# Patient Record
Sex: FEMALE | Race: WHITE | NOT HISPANIC OR LATINO
[De-identification: names, ages, dates, MRNs, and addresses within clinical notes are randomized per-mention and may not be internally consistent; named-entity substitution may affect disease eponyms.]

---

## 2021-10-07 PROBLEM — Z00.00 ENCOUNTER FOR PREVENTIVE HEALTH EXAMINATION: Status: ACTIVE | Noted: 2021-10-07

## 2021-11-16 ENCOUNTER — RESULT REVIEW (OUTPATIENT)
Age: 56
End: 2021-11-16

## 2021-11-16 ENCOUNTER — LABORATORY RESULT (OUTPATIENT)
Age: 56
End: 2021-11-16

## 2021-11-16 ENCOUNTER — NON-APPOINTMENT (OUTPATIENT)
Age: 56
End: 2021-11-16

## 2021-11-16 ENCOUNTER — OUTPATIENT (OUTPATIENT)
Dept: OUTPATIENT SERVICES | Facility: HOSPITAL | Age: 56
LOS: 1 days | End: 2021-11-16
Payer: COMMERCIAL

## 2021-11-16 ENCOUNTER — APPOINTMENT (OUTPATIENT)
Dept: RHEUMATOLOGY | Facility: CLINIC | Age: 56
End: 2021-11-16
Payer: COMMERCIAL

## 2021-11-16 VITALS
HEIGHT: 61.1 IN | SYSTOLIC BLOOD PRESSURE: 146 MMHG | DIASTOLIC BLOOD PRESSURE: 76 MMHG | WEIGHT: 175 LBS | BODY MASS INDEX: 33.04 KG/M2 | TEMPERATURE: 98 F | OXYGEN SATURATION: 97 % | HEART RATE: 89 BPM

## 2021-11-16 DIAGNOSIS — Z87.442 PERSONAL HISTORY OF URINARY CALCULI: ICD-10-CM

## 2021-11-16 DIAGNOSIS — R68.2 DRY MOUTH, UNSPECIFIED: ICD-10-CM

## 2021-11-16 DIAGNOSIS — M54.9 DORSALGIA, UNSPECIFIED: ICD-10-CM

## 2021-11-16 DIAGNOSIS — G89.29 DORSALGIA, UNSPECIFIED: ICD-10-CM

## 2021-11-16 DIAGNOSIS — Z78.9 OTHER SPECIFIED HEALTH STATUS: ICD-10-CM

## 2021-11-16 PROCEDURE — 99205 OFFICE O/P NEW HI 60 MIN: CPT | Mod: 25

## 2021-11-16 PROCEDURE — 73564 X-RAY EXAM KNEE 4 OR MORE: CPT | Mod: 26,50

## 2021-11-16 PROCEDURE — 73110 X-RAY EXAM OF WRIST: CPT | Mod: 26,50

## 2021-11-16 PROCEDURE — 72202 X-RAY EXAM SI JOINTS 3/> VWS: CPT | Mod: 26

## 2021-11-16 PROCEDURE — 72084 X-RAY EXAM ENTIRE SPI 6/> VW: CPT | Mod: 26

## 2021-11-16 PROCEDURE — 73130 X-RAY EXAM OF HAND: CPT | Mod: 26,50

## 2021-11-16 PROCEDURE — 36415 COLL VENOUS BLD VENIPUNCTURE: CPT

## 2021-11-16 RX ORDER — GLIMEPIRIDE 2 MG/1
2 TABLET ORAL
Refills: 0 | Status: ACTIVE | COMMUNITY

## 2021-11-16 RX ORDER — METFORMIN HYDROCHLORIDE 1000 MG/1
1000 TABLET, COATED ORAL
Refills: 0 | Status: ACTIVE | COMMUNITY

## 2021-11-16 NOTE — PHYSICAL EXAM
[General Appearance - Alert] : alert [General Appearance - In No Acute Distress] : in no acute distress [General Appearance - Well Nourished] : well nourished [General Appearance - Well Developed] : well developed [Sclera] : the sclera and conjunctiva were normal [Outer Ear] : the ears and nose were normal in appearance [Examination Of The Oral Cavity] : the lips and gums were normal [Oropharynx] : the oropharynx was normal [Neck Appearance] : the appearance of the neck was normal [Neck Cervical Mass (___cm)] : no neck mass was observed [Jugular Venous Distention Increased] : there was no jugular-venous distention [Thyroid Diffuse Enlargement] : the thyroid was not enlarged [] : no respiratory distress [Respiration, Rhythm And Depth] : normal respiratory rhythm and effort [Exaggerated Use Of Accessory Muscles For Inspiration] : no accessory muscle use [Auscultation Breath Sounds / Voice Sounds] : lungs were clear to auscultation bilaterally [Heart Rate And Rhythm] : heart rate was normal and rhythm regular [Heart Sounds] : normal S1 and S2 [Murmurs] : no murmurs [Abdomen Soft] : soft [Abdomen Tenderness] : non-tender [Cervical Lymph Nodes Enlarged Posterior Bilaterally] : posterior cervical [Supraclavicular Lymph Nodes Enlarged Bilaterally] : supraclavicular [Nail Clubbing] : no clubbing  or cyanosis of the fingernails [Motor Tone] : muscle strength and tone were normal [Sensation] : the sensory exam was normal to light touch and pinprick [Motor Exam] : the motor exam was normal [Oriented To Time, Place, And Person] : oriented to person, place, and time [Impaired Insight] : insight and judgment were intact [FreeTextEntry1] : scalp, post cervical neck and extensor elbow psoriatic plaques

## 2021-11-16 NOTE — REVIEW OF SYSTEMS
[Chills] : chills [Feeling Tired] : feeling tired [Recent Weight Gain (___ Lbs)] : recent [unfilled] ~Ulb weight gain [Arthralgias] : arthralgias [Joint Pain] : joint pain [Joint Swelling] : joint swelling [Joint Stiffness] : joint stiffness [Limb Pain] : limb pain [Negative] : Gastrointestinal [Fever] : no fever [Feeling Poorly] : not feeling poorly [Recent Weight Loss (___ Lbs)] : no recent weight loss [Dizziness] : no dizziness [Fainting] : no fainting [Anxiety] : no anxiety [Depression] : no depression [Muscle Weakness] : no muscle weakness [Easy Bleeding] : no tendency for easy bleeding [Easy Bruising] : no tendency for easy bruising [Swollen Glands] : no swollen glands [Swollen Glands In The Neck] : no swollen glands in the neck [de-identified] : psoriasis plaques

## 2021-11-16 NOTE — ASSESSMENT
[FreeTextEntry1] : 57 y/o F DM on Metformin 1000mg BID, HTN, HLD,  obesity ( BMI 32), psoriasis  was referred for chronic joint pain and swelling that has been ongoing for past few years. Exam noted with R wrist and R knee active synovitis and psoriatic plaques \par Suspecting inflammatory arthritis, RA vs psoriatic arthritis vs peripheral SpA . \par we talked checking Rheum labs ( also other labs to rule out infections etiology of arthritis Tb, HIV, Hep panel, Lyme) \par Labs done during the visit today. \par Recommended c/w daily NSAIDs for now for joint pain and topical steroids for psoriasis. \par Xrays: spine, SI joint, b/l hand/wrist and knees \par Will avoid steroids given hx of DM ( unknown A1C) \par I discussed role of DMARDs and biologic therapy if labs/images confirms diagnoses. \par Most likely will treat with MTX. \par \par MTX side effects discussed\par Gastrointestinal problems, such as nausea, stomach upset, and loose stools\par Stomatitis or soreness of the mouth\par Abnormal liver chemistries, which are typically mild elevations in hepatic transaminases \par A macular punctate cutaneous eruption, which usually occurs on the extremities, often affecting the elbows and knees, but sparing the trunk\par Central nervous system symptoms, including headache, fatigue, malaise, or impaired ability to concentrate\par Alopecia\par Fever, which is drug-related, although fever can also occur due to infection \par Hematologic abnormalities, particularly macrocytosis, in addition to infrequent but severe myelosuppression \par MTX is an abortifacient that can also induce congenital anomalies if taken during pregnancy, instructed to stop medication 6 months prior to conception.\par occasionally MTX induced lung disease. \par Medication does not work immediately and it will take up to 12 weeks before seeing any improvement\par \par Will call to review work up \par \par f/u in 1 month \par \par \par

## 2021-11-16 NOTE — HISTORY OF PRESENT ILLNESS
[Fatigue] : fatigue [Dry Mouth] : dry mouth [Arthralgias] : arthralgias [Joint Swelling] : joint swelling [Decreased ROM] : decreased range of motion [Morning Stiffness] : morning stiffness [Difficulty Walking] : difficulty walking [Myalgias] : myalgias [Muscle Spasms] : muscle spasms [FreeTextEntry1] : Referred by [facility] member  for Rheumatology consultation \par PCP: Eliezer Haddad \par \par 55 y/o F DM on Metformin 1000mg BID, HTN, HLD,  obesity ( BMI 32) \par menopause for past 2 years with recurrent hot flashes. \par He immigrated from [place] to [place] 8 years ago and soon after noticed sensitivity to both hands after cold exposure, with pain and numbness of all fingers.   2 years ago developed  R knee pain and swelling, initially it was on and off and for past one year feels persistent daily pain, has trouble bare the weight on right side, ambulation is difficult,  for past 4 months she has been experiencing polyarthralgia, hours of AM stiffness. \par Neck pain radiating down to entire spine. \par can not get up from the bed or sitting position without assistance,  the pain. Has trouble making the fist, unable to open jars or bottles. \par Has tried NSAID( brought from [place]) Nimesil which helps with the pain. \par She has hx of psoriasis ( affecting scalp, posterior neck and extensor elbows), using topical steroid cream ( Betamethasone ( brand name  Diprosalic) \par Hx of plantar  fasciitis \par Has fatigue,dry mouth, does not drink enough water \par muscle ache \par \par Denies hx of dactylitis, tendinitis or inflammatory eye disease. \par \par No h/o , memory loss, patchy hair loss, sicca symptoms, photosensitivity, HA,  Raynaud's, oral ulcers, nasal ulcers. \par No history of pleuropericarditis \par No history of protein/hematuria \par No history of cytopenias. \par No Hx of VTE/DVT/PE\par  [Anorexia] : no anorexia [Weight Loss] : no weight loss [Malaise] : no malaise [Fever] : no fever [Chills] : no chills [Malar Facial Rash] : no malar facial rash [Skin Lesions] : no lesions [Skin Nodules] : no skin nodules [Oral Ulcers] : no oral ulcers [Cough] : no cough [Dysphonia] : no dysphonia [Dysphagia] : no dysphagia [Shortness of Breath] : no shortness of breath [Chest Pain] : no chest pain [Joint Warmth] : no joint warmth [Falls] : no falls [Dyspnea] : no dyspnea [Muscle Weakness] : no muscle weakness [Muscle Cramping] : no muscle cramping [Visual Changes] : no visual changes [Eye Pain] : no eye pain [Eye Redness] : no eye redness [Dry Eyes] : no dry eyes

## 2021-11-19 LAB
25(OH)D3 SERPL-MCNC: 14.9 NG/ML
A PHAGOCYTOPH IGG TITR SER IF: NORMAL TITER
ALBUMIN SERPL ELPH-MCNC: 4.7 G/DL
ALP BLD-CCNC: 94 U/L
ALT SERPL-CCNC: 26 U/L
ANION GAP SERPL CALC-SCNC: 13 MMOL/L
APPEARANCE: ABNORMAL
APPEARANCE: ABNORMAL
AST SERPL-CCNC: 23 U/L
B BURGDOR AB SER QL IA: NEGATIVE
B BURGDOR AB SER-IMP: NEGATIVE
B BURGDOR IGG+IGM SER QL: 0.07 INDEX
B MICROTI IGG TITR SER: NORMAL TITER
BACTERIA: NEGATIVE
BASOPHILS # BLD AUTO: 0.01 K/UL
BASOPHILS NFR BLD AUTO: 0.2 %
BILIRUB SERPL-MCNC: 0.8 MG/DL
BILIRUBIN URINE: NEGATIVE
BILIRUBIN URINE: NEGATIVE
BLOOD URINE: NEGATIVE
BLOOD URINE: NEGATIVE
BUN SERPL-MCNC: 13 MG/DL
C3 SERPL-MCNC: 202 MG/DL
C4 SERPL-MCNC: 32 MG/DL
CALCIUM SERPL-MCNC: 9.8 MG/DL
CCP AB SER IA-ACNC: <8 UNITS
CHLORIDE SERPL-SCNC: 103 MMOL/L
CHOLEST SERPL-MCNC: 161 MG/DL
CO2 SERPL-SCNC: 26 MMOL/L
COLOR: YELLOW
COLOR: YELLOW
CREAT SERPL-MCNC: 0.58 MG/DL
CRP SERPL-MCNC: 17 MG/L
E CHAFFEENSIS IGG TITR SER IF: NORMAL TITER
ENA SS-A AB SER IA-ACNC: <0.2 AL
ENA SS-B AB SER IA-ACNC: <0.2 AL
EOSINOPHIL # BLD AUTO: 0.04 K/UL
EOSINOPHIL NFR BLD AUTO: 0.6 %
ERYTHROCYTE [SEDIMENTATION RATE] IN BLOOD BY WESTERGREN METHOD: 35 MM/HR
ESTIMATED AVERAGE GLUCOSE: 223 MG/DL
GLUCOSE QUALITATIVE U: ABNORMAL
GLUCOSE QUALITATIVE U: ABNORMAL
GLUCOSE SERPL-MCNC: 251 MG/DL
HBA1C MFR BLD HPLC: 9.4 %
HBV CORE IGG+IGM SER QL: NONREACTIVE
HBV SURFACE AB SER QL: REACTIVE
HBV SURFACE AG SER QL: NONREACTIVE
HCT VFR BLD CALC: 40.9 %
HCV AB SER QL: NONREACTIVE
HCV S/CO RATIO: 0.09 S/CO
HDLC SERPL-MCNC: 47 MG/DL
HGB BLD-MCNC: 13.9 G/DL
HIV1+2 AB SPEC QL IA.RAPID: NONREACTIVE
HYALINE CASTS: 1 /LPF
IMM GRANULOCYTES NFR BLD AUTO: 0.3 %
KETONES URINE: NORMAL
KETONES URINE: NORMAL
LDLC SERPL CALC-MCNC: 92 MG/DL
LEUKOCYTE ESTERASE URINE: ABNORMAL
LEUKOCYTE ESTERASE URINE: ABNORMAL
LYMPHOCYTES # BLD AUTO: 1.16 K/UL
LYMPHOCYTES NFR BLD AUTO: 18.1 %
M TB IFN-G BLD-IMP: POSITIVE
MAN DIFF?: NORMAL
MCHC RBC-ENTMCNC: 29.8 PG
MCHC RBC-ENTMCNC: 34 GM/DL
MCV RBC AUTO: 87.6 FL
MICROSCOPIC-UA: NORMAL
MONOCYTES # BLD AUTO: 0.41 K/UL
MONOCYTES NFR BLD AUTO: 6.4 %
NEUTROPHILS # BLD AUTO: 4.77 K/UL
NEUTROPHILS NFR BLD AUTO: 74.4 %
NITRITE URINE: NEGATIVE
NITRITE URINE: NEGATIVE
NONHDLC SERPL-MCNC: 114 MG/DL
PH URINE: 6
PH URINE: 6
PLATELET # BLD AUTO: 262 K/UL
POTASSIUM SERPL-SCNC: 4.4 MMOL/L
PROT SERPL-MCNC: 7.4 G/DL
PROTEIN URINE: ABNORMAL
PROTEIN URINE: ABNORMAL
QUANTIFERON TB PLUS MITOGEN MINUS NIL: 8.67 IU/ML
QUANTIFERON TB PLUS NIL: 0.73 IU/ML
QUANTIFERON TB PLUS TB1 MINUS NIL: 8.68 IU/ML
QUANTIFERON TB PLUS TB2 MINUS NIL: 8.55 IU/ML
RBC # BLD: 4.67 M/UL
RBC # FLD: 12.3 %
RED BLOOD CELLS URINE: 1 /HPF
RF+CCP IGG SER-IMP: NEGATIVE
RHEUMATOID FACT SER QL: <10 IU/ML
SODIUM SERPL-SCNC: 142 MMOL/L
SPECIFIC GRAVITY URINE: 1.03
SPECIFIC GRAVITY URINE: 1.03
SQUAMOUS EPITHELIAL CELLS: 2 /HPF
TRIGL SERPL-MCNC: 112 MG/DL
URATE SERPL-MCNC: 3.5 MG/DL
UROBILINOGEN URINE: NORMAL
UROBILINOGEN URINE: NORMAL
WBC # FLD AUTO: 6.41 K/UL
WHITE BLOOD CELLS URINE: 15 /HPF

## 2021-11-29 LAB
ANA PAT FLD IF-IMP: ABNORMAL
ANACR T: ABNORMAL
HLA-B27 RELATED AG QL: NEGATIVE

## 2021-12-17 ENCOUNTER — APPOINTMENT (OUTPATIENT)
Dept: RHEUMATOLOGY | Facility: CLINIC | Age: 56
End: 2021-12-17
Payer: COMMERCIAL

## 2021-12-17 DIAGNOSIS — E11.9 TYPE 2 DIABETES MELLITUS W/OUT COMPLICATIONS: ICD-10-CM

## 2021-12-17 PROCEDURE — 99443: CPT

## 2021-12-17 RX ORDER — HYDROXYCHLOROQUINE SULFATE 200 MG/1
200 TABLET, FILM COATED ORAL TWICE DAILY
Qty: 60 | Refills: 3 | Status: DISCONTINUED | COMMUNITY
Start: 2021-12-17 | End: 2021-12-17

## 2021-12-17 RX ORDER — MELOXICAM 7.5 MG/1
7.5 TABLET ORAL
Qty: 30 | Refills: 0 | Status: ACTIVE | COMMUNITY
Start: 2021-12-17 | End: 1900-01-01

## 2021-12-17 NOTE — HISTORY OF PRESENT ILLNESS
[Home] : at home, [unfilled] , at the time of the visit. [Medical Office: ([facility])___] : at the medical office located in  [Verbal consent obtained from patient] : the patient, [unfilled] [Fatigue] : fatigue [Dry Mouth] : dry mouth [Arthralgias] : arthralgias [Joint Swelling] : joint swelling [Decreased ROM] : decreased range of motion [Morning Stiffness] : morning stiffness [Difficulty Walking] : difficulty walking [Myalgias] : myalgias [Muscle Spasms] : muscle spasms [FreeTextEntry1] : Follow up: 12/17/21 \par Telephonic visit \par 55 y/o F DM on Metformin 1000mg BID, HTN, HLD,  obesity, inflammatory arthritis likely seronegative RA or psoriatic arthritis \par Has elevated inflammatory markers, RF, CCP negative \par Low titer + ANBA but negative confirmatory lupus labs- xrays no radiographic inflammatory changes reported. \par Found uncontrolled DM with high HgA1C- pending endo appt ( scheduled for next week) \par +Tb mohini- pending CXR. \par Suspecting seronegative RA vs psoriatic arthritis. \par Vit D deficiency \par Reports severe joint pain and swelling. \par \par Meds:\par Ergocalciferol 50.000IU weekly \par \par \par \par Referred by [facility] community member  for Rheumatology consultation \par PCP: Eliezer Haddad \par \par 55 y/o F DM on Metformin 1000mg BID, HTN, HLD,  obesity ( BMI 32) \par menopause for past 2 years with recurrent hot flashes. \par He immigrated from [place] to [place] 8 years ago and soon after noticed sensitivity to both hands after cold exposure, with pain and numbness of all fingers.   2 years ago developed  R knee pain and swelling, initially it was on and off and for past one year feels persistent daily pain, has trouble bare the weight on right side, ambulation is difficult,  for past 4 months she has been experiencing polyarthralgia, hours of AM stiffness. \par Neck pain radiating down to entire spine. \par can not get up from the bed or sitting position without assistance,  the pain. Has trouble making the fist, unable to open jars or bottles. \par Has tried NSAID( brought from [place]) Nimesil which helps with the pain. \par She has hx of psoriasis ( affecting scalp, posterior neck and extensor elbows), using topical steroid cream ( Betamethasone ( brand name  Diprosalic) \par Hx of plantar  fasciitis \par Has fatigue,dry mouth, does not drink enough water \par muscle ache \par \par Denies hx of dactylitis, tendinitis or inflammatory eye disease. \par \par No h/o , memory loss, patchy hair loss, sicca symptoms, photosensitivity, HA,  Raynaud's, oral ulcers, nasal ulcers. \par No history of pleuropericarditis \par No history of protein/hematuria \par No history of cytopenias. \par No Hx of VTE/DVT/PE\par  [Anorexia] : no anorexia [Weight Loss] : no weight loss [Malaise] : no malaise [Fever] : no fever [Chills] : no chills [Malar Facial Rash] : no malar facial rash [Skin Lesions] : no lesions [Skin Nodules] : no skin nodules [Oral Ulcers] : no oral ulcers [Cough] : no cough [Dysphonia] : no dysphonia [Dysphagia] : no dysphagia [Shortness of Breath] : no shortness of breath [Chest Pain] : no chest pain [Joint Warmth] : no joint warmth [Falls] : no falls [Dyspnea] : no dyspnea [Muscle Weakness] : no muscle weakness [Muscle Cramping] : no muscle cramping [Visual Changes] : no visual changes [Eye Pain] : no eye pain [Eye Redness] : no eye redness [Dry Eyes] : no dry eyes

## 2021-12-17 NOTE — ASSESSMENT
[FreeTextEntry1] : 55 y/o F DM on Metformin 1000mg BID, HTN, HLD,  obesity ( BMI 32), psoriasis  was referred for chronic joint pain and swelling that has been ongoing for past few years. Original exam noted with R wrist and R knee active synovitis and psoriatic plaques \par Suspecting inflammatory arthritis, RA vs psoriatic arthritis\par Has elevated inflammatory markers, RF, CCP negative \par Low titer + GEMMA but negative confirmatory lupus labs- xrays no radiographic inflammatory changes reported. \par Found uncontrolled DM with high HgA1C( >9)- pending endo appt ( scheduled for next week) \par +Tb mohini- pending CXR. \par \par  \par 1. Inflammatory arthritis: seronegative RA vs psoriatic arthritis \par start low dose MTX 7.5mg q weekly and Folic acid, she will need follow up labs in 3-4 weeks after starting MTX\par Can also take Meloxicam 7.5mg for pain instead of NSAID med she is getting from outside of the  and not helping. \par Most likely will treat with MTX. \par \par MTX side effects discussed\par Gastrointestinal problems, such as nausea, stomach upset, and loose stools\par Stomatitis or soreness of the mouth\par Abnormal liver chemistries, which are typically mild elevations in hepatic transaminases \par A macular punctate cutaneous eruption, which usually occurs on the extremities, often affecting the elbows and knees, but sparing the trunk\par Central nervous system symptoms, including headache, fatigue, malaise, or impaired ability to concentrate\par Alopecia\par Fever, which is drug-related, although fever can also occur due to infection \par Hematologic abnormalities, particularly macrocytosis, in addition to infrequent but severe myelosuppression \par occasionally MTX induced lung disease. \par Medication does not work immediately and it will take up to 12 weeks before seeing any improvement\par \par \par 2. Possible latent TB: pending CXR and ID consultation, pt most likely need to be treated as she may need biologic therapy in the future if MTX \par Faxed CRX referral at [phone] to be done outside of the [facility] as per pt request. \par \par 3. Possible UTI: Pt does not have symptoms but she is DM and will start MTX, so will treat for amoxicillin X5 days. \par \par 4. Uncontrolled DM: has appt with endocrinologist, likely she will need insulin. \par \par 5. Vit D deficiency: c/w replacement once a week.  \par \par f/u in 1 month \par \par \par

## 2021-12-17 NOTE — REVIEW OF SYSTEMS
[Chills] : chills [Feeling Tired] : feeling tired [Recent Weight Gain (___ Lbs)] : recent [unfilled] ~Ulb weight gain [Arthralgias] : arthralgias [Joint Pain] : joint pain [Joint Swelling] : joint swelling [Joint Stiffness] : joint stiffness [Limb Pain] : limb pain [Negative] : Gastrointestinal [Fever] : no fever [Feeling Poorly] : not feeling poorly [Recent Weight Loss (___ Lbs)] : no recent weight loss [Dizziness] : no dizziness [Fainting] : no fainting [Anxiety] : no anxiety [Depression] : no depression [Muscle Weakness] : no muscle weakness [Easy Bleeding] : no tendency for easy bleeding [Easy Bruising] : no tendency for easy bruising [Swollen Glands] : no swollen glands [Swollen Glands In The Neck] : no swollen glands in the neck [de-identified] : psoriasis plaques

## 2022-01-18 ENCOUNTER — APPOINTMENT (OUTPATIENT)
Dept: RHEUMATOLOGY | Facility: CLINIC | Age: 57
End: 2022-01-18
Payer: MEDICAID

## 2022-01-18 ENCOUNTER — LABORATORY RESULT (OUTPATIENT)
Age: 57
End: 2022-01-18

## 2022-01-18 VITALS
HEART RATE: 109 BPM | WEIGHT: 172 LBS | SYSTOLIC BLOOD PRESSURE: 115 MMHG | TEMPERATURE: 98.2 F | HEIGHT: 61 IN | OXYGEN SATURATION: 99 % | DIASTOLIC BLOOD PRESSURE: 77 MMHG | BODY MASS INDEX: 32.47 KG/M2

## 2022-01-18 DIAGNOSIS — E78.5 HYPERLIPIDEMIA, UNSPECIFIED: ICD-10-CM

## 2022-01-18 DIAGNOSIS — L40.9 PSORIASIS, UNSPECIFIED: ICD-10-CM

## 2022-01-18 DIAGNOSIS — N39.0 URINARY TRACT INFECTION, SITE NOT SPECIFIED: ICD-10-CM

## 2022-01-18 DIAGNOSIS — B95.5 URINARY TRACT INFECTION, SITE NOT SPECIFIED: ICD-10-CM

## 2022-01-18 DIAGNOSIS — I10 ESSENTIAL (PRIMARY) HYPERTENSION: ICD-10-CM

## 2022-01-18 DIAGNOSIS — R76.8 OTHER SPECIFIED ABNORMAL IMMUNOLOGICAL FINDINGS IN SERUM: ICD-10-CM

## 2022-01-18 PROCEDURE — 99214 OFFICE O/P EST MOD 30 MIN: CPT | Mod: 25

## 2022-01-18 RX ORDER — LISINOPRIL AND HYDROCHLOROTHIAZIDE TABLETS 20; 12.5 MG/1; MG/1
20-12.5 TABLET ORAL DAILY
Qty: 90 | Refills: 0 | Status: ACTIVE | COMMUNITY
Start: 1900-01-01 | End: 1900-01-01

## 2022-01-18 RX ORDER — ATORVASTATIN CALCIUM 10 MG/1
10 TABLET, FILM COATED ORAL
Qty: 90 | Refills: 0 | Status: ACTIVE | COMMUNITY
Start: 1900-01-01 | End: 1900-01-01

## 2022-01-18 RX ORDER — METOPROLOL TARTRATE 50 MG/1
50 TABLET, FILM COATED ORAL DAILY
Qty: 90 | Refills: 0 | Status: ACTIVE | COMMUNITY
Start: 1900-01-01 | End: 1900-01-01

## 2022-01-18 RX ORDER — LISINOPRIL AND HYDROCHLOROTHIAZIDE TABLETS 20; 12.5 MG/1; MG/1
20-12.5 TABLET ORAL
Qty: 90 | Refills: 0 | Status: ACTIVE | COMMUNITY
Start: 2021-06-21

## 2022-01-18 RX ORDER — METOPROLOL SUCCINATE 50 MG/1
50 TABLET, EXTENDED RELEASE ORAL
Qty: 90 | Refills: 0 | Status: ACTIVE | COMMUNITY
Start: 2021-06-21 | End: 1900-01-01

## 2022-01-18 NOTE — HISTORY OF PRESENT ILLNESS
[Fatigue] : fatigue [Dry Mouth] : dry mouth [Arthralgias] : arthralgias [Joint Swelling] : joint swelling [Decreased ROM] : decreased range of motion [Morning Stiffness] : morning stiffness [Difficulty Walking] : difficulty walking [Myalgias] : myalgias [Muscle Spasms] : muscle spasms [___ Month(s) Ago] : [unfilled] month(s) ago [FreeTextEntry1] : Follow up: 1/18/22 \par \par 55 y/o F DM on Metformin 1000mg BID, HTN, HLD,  obesity, inflammatory arthritis likely seronegative RA or psoriatic arthritis \par Has elevated inflammatory markers, RF, CCP negative \par Low titer + GEMMA but negative confirmatory lupus labs- xrays no radiographic inflammatory changes reported. \par Found uncontrolled DM with high HgA1C- saw endo, recommended insulin but she declined. \par Likely latent Tb- had Xrays done outside of the [facility], have not received copy, she has not made appt with ID \par UTI- treated with abx  \par \par Suspecting seronegative RA vs psoriatic arthritis. \par Vit D deficiency \par Reports severe joint pain and intermittent swelling. She does not feel any difference since MTX yet. \par \par Meds:\par Ergocalciferol 50.000IU weekly \par MTX 7.5mg q weekly since last visit along with folic acid ( started 1 month ago)\par \par \par \par \par \par \par \par Follow up: 12/17/21 \par Telephonic visit \par 55 y/o F DM on Metformin 1000mg BID, HTN, HLD,  obesity, inflammatory arthritis likely seronegative RA or psoriatic arthritis \par Has elevated inflammatory markers, RF, CCP negative \par Low titer + ANBA but negative confirmatory lupus labs- xrays no radiographic inflammatory changes reported. \par Found uncontrolled DM with high HgA1C- pending endo appt ( scheduled for next week) \par +Tb mohini- pending CXR. \par Suspecting seronegative RA vs psoriatic arthritis. \par Vit D deficiency \par Reports severe joint pain and swelling. \par \par Meds:\par Ergocalciferol 50.000IU weekly \par \par \par \par Referred by [facility] community member  for Rheumatology consultation \par PCP: Eliezer Haddad \par \par 55 y/o F DM on Metformin 1000mg BID, HTN, HLD,  obesity ( BMI 32) \par menopause for past 2 years with recurrent hot flashes. \par He immigrated from [place] to [place] 8 years ago and soon after noticed sensitivity to both hands after cold exposure, with pain and numbness of all fingers.   2 years ago developed  R knee pain and swelling, initially it was on and off and for past one year feels persistent daily pain, has trouble bare the weight on right side, ambulation is difficult,  for past 4 months she has been experiencing polyarthralgia, hours of AM stiffness. \par Neck pain radiating down to entire spine. \par can not get up from the bed or sitting position without assistance,  the pain. Has trouble making the fist, unable to open jars or bottles. \par Has tried NSAID( brought from [place]) Nimesil which helps with the pain. \par She has hx of psoriasis ( affecting scalp, posterior neck and extensor elbows), using topical steroid cream ( Betamethasone ( brand name  Diprosalic) \par Hx of plantar  fasciitis \par Has fatigue,dry mouth, does not drink enough water \par muscle ache \par \par Denies hx of dactylitis, tendinitis or inflammatory eye disease. \par \par No h/o , memory loss, patchy hair loss, sicca symptoms, photosensitivity, HA,  Raynaud's, oral ulcers, nasal ulcers. \par No history of pleuropericarditis \par No history of protein/hematuria \par No history of cytopenias. \par No Hx of VTE/DVT/PE\par  [Anorexia] : no anorexia [Weight Loss] : no weight loss [Malaise] : no malaise [Fever] : no fever [Chills] : no chills [Malar Facial Rash] : no malar facial rash [Skin Lesions] : no lesions [Skin Nodules] : no skin nodules [Oral Ulcers] : no oral ulcers [Cough] : no cough [Dysphonia] : no dysphonia [Dysphagia] : no dysphagia [Shortness of Breath] : no shortness of breath [Chest Pain] : no chest pain [Joint Warmth] : no joint warmth [Falls] : no falls [Dyspnea] : no dyspnea [Muscle Weakness] : no muscle weakness [Muscle Cramping] : no muscle cramping [Visual Changes] : no visual changes [Eye Pain] : no eye pain [Eye Redness] : no eye redness [Dry Eyes] : no dry eyes

## 2022-01-18 NOTE — PHYSICAL EXAM
[General Appearance - Alert] : alert [General Appearance - In No Acute Distress] : in no acute distress [Oriented To Time, Place, And Person] : oriented to person, place, and time [Impaired Insight] : insight and judgment were intact [Respiration, Rhythm And Depth] : normal respiratory rhythm and effort [Auscultation Breath Sounds / Voice Sounds] : lungs were clear to auscultation bilaterally [Heart Rate And Rhythm] : heart rate was normal and rhythm regular [Heart Sounds] : normal S1 and S2 [Nail Clubbing] : no clubbing  or cyanosis of the fingernails [Motor Tone] : muscle strength and tone were normal [Neck Appearance] : the appearance of the neck was normal [] : the neck was supple [FreeTextEntry1] : psoriatic plaques in scalp and ext surfaces of b/l elbow

## 2022-01-18 NOTE — ASSESSMENT
[FreeTextEntry1] : 57 y/o F DM on Metformin 1000mg BID, HTN, HLD,  obesity ( BMI 32), psoriasis  was referred for chronic joint pain and swelling that has been ongoing for past few years. Original exam noted with R wrist and R knee active synovitis and psoriatic plaques \par Suspecting inflammatory arthritis, RA vs psoriatic arthritis\par Has elevated inflammatory markers, RF, CCP negative \par Low titer + GEMMA but negative confirmatory lupus labs- xrays no radiographic inflammatory changes reported. \par Found uncontrolled DM with high HgA1C( >9) saw shana, recommended insulin but she declined. \par I had extensive conversation with pt about importance of diabetes control, risk of CV, stroke and renal failure discusses. \par \par \par 1. Inflammatory arthritis: seronegative RA vs psoriatic arthritis \par Does not feel any better since MTX, but likely it's too soon and she has been taking low dose. \par We talked about if normal f/u labs should increase MTX 7.5mg to 15mg \par c/w Folic acid\par Can apply topical  vitamin D analogs twice daily, given uncontrolled DM will minimize topical steroid use, as absorption is still possible and her psoriasis does not involve more then 2% os skin area. \par Labs done during the visit today. \par + GEMMA, will send AVISE today \par \par \par 2. Possible latent TB: requested copy of recent  CXR and reminded pt about ID consultation, pt most likely need to be treated as she may need biologic therapy in the future if MTX failure. \par \par 3. She has lost follow up with PCP, off BP meds, will renew before pt finds new PCP\par Metoprolol succinate 50\par Lisinopril/HCTZ 20-12.5 and Atorvastatin renewed. \par \par 4. Vit D deficiency: c/w replacement once a week.  \par \par f/u in 2 month \par \par \par

## 2022-01-18 NOTE — REVIEW OF SYSTEMS
[Chills] : chills [Feeling Tired] : feeling tired [Recent Weight Gain (___ Lbs)] : recent [unfilled] ~Ulb weight gain [Arthralgias] : arthralgias [Joint Pain] : joint pain [Joint Swelling] : joint swelling [Joint Stiffness] : joint stiffness [Limb Pain] : limb pain [Negative] : Gastrointestinal [Fever] : no fever [Feeling Poorly] : not feeling poorly [Recent Weight Loss (___ Lbs)] : no recent weight loss [Dizziness] : no dizziness [Fainting] : no fainting [Anxiety] : no anxiety [Depression] : no depression [Muscle Weakness] : no muscle weakness [Easy Bleeding] : no tendency for easy bleeding [Easy Bruising] : no tendency for easy bruising [Swollen Glands] : no swollen glands [Swollen Glands In The Neck] : no swollen glands in the neck [de-identified] : psoriasis plaques

## 2022-01-19 LAB
ALBUMIN SERPL ELPH-MCNC: 5.1 G/DL
ALP BLD-CCNC: 86 U/L
ALT SERPL-CCNC: 35 U/L
ANION GAP SERPL CALC-SCNC: 15 MMOL/L
APPEARANCE: ABNORMAL
AST SERPL-CCNC: 33 U/L
BASOPHILS # BLD AUTO: 0.02 K/UL
BASOPHILS NFR BLD AUTO: 0.4 %
BILIRUB SERPL-MCNC: 0.9 MG/DL
BILIRUBIN URINE: NEGATIVE
BLOOD URINE: NEGATIVE
BUN SERPL-MCNC: 13 MG/DL
CALCIUM SERPL-MCNC: 10.3 MG/DL
CHLORIDE SERPL-SCNC: 101 MMOL/L
CO2 SERPL-SCNC: 24 MMOL/L
COLOR: YELLOW
CREAT SERPL-MCNC: 0.62 MG/DL
CREAT SPEC-SCNC: 145 MG/DL
CREAT/PROT UR: 0.1 RATIO
CRP SERPL-MCNC: 14 MG/L
EOSINOPHIL # BLD AUTO: 0.05 K/UL
EOSINOPHIL NFR BLD AUTO: 0.9 %
ERYTHROCYTE [SEDIMENTATION RATE] IN BLOOD BY WESTERGREN METHOD: 35 MM/HR
GLUCOSE QUALITATIVE U: ABNORMAL
GLUCOSE SERPL-MCNC: 166 MG/DL
HCT VFR BLD CALC: 41.7 %
HGB BLD-MCNC: 13.4 G/DL
IMM GRANULOCYTES NFR BLD AUTO: 0.4 %
KETONES URINE: ABNORMAL
LEUKOCYTE ESTERASE URINE: ABNORMAL
LYMPHOCYTES # BLD AUTO: 1.48 K/UL
LYMPHOCYTES NFR BLD AUTO: 27.6 %
MAN DIFF?: NORMAL
MCHC RBC-ENTMCNC: 28 PG
MCHC RBC-ENTMCNC: 32.1 GM/DL
MCV RBC AUTO: 87.1 FL
MONOCYTES # BLD AUTO: 0.41 K/UL
MONOCYTES NFR BLD AUTO: 7.6 %
NEUTROPHILS # BLD AUTO: 3.39 K/UL
NEUTROPHILS NFR BLD AUTO: 63.1 %
NITRITE URINE: NEGATIVE
PH URINE: 5.5
PLATELET # BLD AUTO: 242 K/UL
POTASSIUM SERPL-SCNC: 4.4 MMOL/L
PROT SERPL-MCNC: 7.2 G/DL
PROT UR-MCNC: 18 MG/DL
PROTEIN URINE: ABNORMAL
RBC # BLD: 4.79 M/UL
RBC # FLD: 13.2 %
SODIUM SERPL-SCNC: 141 MMOL/L
SPECIFIC GRAVITY URINE: 1.03
UROBILINOGEN URINE: NORMAL
WBC # FLD AUTO: 5.37 K/UL

## 2022-02-01 LAB
MISCELLANEOUS TEST: NORMAL
PROC NAME: NORMAL

## 2022-03-14 ENCOUNTER — NON-APPOINTMENT (OUTPATIENT)
Age: 57
End: 2022-03-14

## 2022-03-29 ENCOUNTER — APPOINTMENT (OUTPATIENT)
Dept: RHEUMATOLOGY | Facility: CLINIC | Age: 57
End: 2022-03-29
Payer: MEDICAID

## 2022-03-29 VITALS
SYSTOLIC BLOOD PRESSURE: 133 MMHG | HEIGHT: 61 IN | BODY MASS INDEX: 31.72 KG/M2 | HEART RATE: 91 BPM | WEIGHT: 168 LBS | OXYGEN SATURATION: 98 % | TEMPERATURE: 98.2 F | DIASTOLIC BLOOD PRESSURE: 77 MMHG

## 2022-03-29 DIAGNOSIS — R53.82 CHRONIC FATIGUE, UNSPECIFIED: ICD-10-CM

## 2022-03-29 PROCEDURE — 99214 OFFICE O/P EST MOD 30 MIN: CPT | Mod: 25

## 2022-03-29 RX ORDER — CALCITRIOL 3 UG/G
3 OINTMENT TOPICAL
Qty: 2 | Refills: 1 | Status: ACTIVE | COMMUNITY
Start: 2022-01-18 | End: 1900-01-01

## 2022-03-29 NOTE — REVIEW OF SYSTEMS
[Chills] : chills [Feeling Tired] : feeling tired [Recent Weight Gain (___ Lbs)] : recent [unfilled] ~Ulb weight gain [Arthralgias] : arthralgias [Joint Pain] : joint pain [Joint Swelling] : joint swelling [Joint Stiffness] : joint stiffness [Limb Pain] : limb pain [Negative] : Gastrointestinal [Fever] : no fever [Feeling Poorly] : not feeling poorly [Recent Weight Loss (___ Lbs)] : no recent weight loss [Dizziness] : no dizziness [Fainting] : no fainting [Anxiety] : no anxiety [Depression] : no depression [Muscle Weakness] : no muscle weakness [Easy Bleeding] : no tendency for easy bleeding [Easy Bruising] : no tendency for easy bruising [Swollen Glands] : no swollen glands [Swollen Glands In The Neck] : no swollen glands in the neck [de-identified] : psoriasis plaques

## 2022-03-29 NOTE — HISTORY OF PRESENT ILLNESS
[___ Month(s) Ago] : [unfilled] month(s) ago [Fatigue] : fatigue [Dry Mouth] : dry mouth [Arthralgias] : arthralgias [Joint Swelling] : joint swelling [Decreased ROM] : decreased range of motion [Morning Stiffness] : morning stiffness [Difficulty Walking] : difficulty walking [Myalgias] : myalgias [Muscle Spasms] : muscle spasms [FreeTextEntry1] : Follow up: 3/2922 \par PCP: Dr. Jeronimo Mera \par \par 55 y/o F DM on Metformin 1000mg BID, HTN, HLD,  obesity, inflammatory arthritis likely seronegative RA or psoriatic arthritis \par Has elevated inflammatory markers, RF, CCP negative \par Found uncontrolled DM with high HgA1C- saw endo, recommended insulin but she declined. She takes Glimepiride \par latent Tb- had Xrays done outside of the [facility], saw ID and started on Rifampin but instead of taking 2 tablets she takes one a day. \par MSK symptoms joint pain, swelling and stiffness significantly improved about  since MTX increased, overall feels 30-40% improvement. \par She established care with primary care doc and states BP is well controlled. \par lost 7Ibs since last visit, trying to watch diet. \par \par Meds:\par Ergocalciferol 50.000IU weekly \par MTX 15mg q weekly and folic acid ( started 1 month ago)\par \par \par \par \par \par Follow up: 1/18/22 \par \par 55 y/o F DM on Metformin 1000mg BID, HTN, HLD,  obesity, inflammatory arthritis likely seronegative RA or psoriatic arthritis \par Has elevated inflammatory markers, RF, CCP negative \par Low titer + GEMMA but negative confirmatory lupus labs- xrays no radiographic inflammatory changes reported. \par Found uncontrolled DM with high HgA1C- saw endo, recommended insulin but she declined. \par Likely latent Tb- had Xrays done outside of the [facility], have not received copy, she has not made appt with ID \par UTI- treated with abx  \par \par Suspecting seronegative RA vs psoriatic arthritis. \par Vit D deficiency \par Reports severe joint pain and intermittent swelling. She does not feel any difference since MTX yet. \par \par Meds:\par Ergocalciferol 50.000IU weekly \par MTX 7.5mg q weekly since last visit along with folic acid ( started 1 month ago)\par \par \par \par \par \par \par \par Follow up: 12/17/21 \par Telephonic visit \par 55 y/o F DM on Metformin 1000mg BID, HTN, HLD,  obesity, inflammatory arthritis likely seronegative RA or psoriatic arthritis \par Has elevated inflammatory markers, RF, CCP negative \par Low titer + ANBA but negative confirmatory lupus labs- xrays no radiographic inflammatory changes reported. \par Found uncontrolled DM with high HgA1C- pending endo appt ( scheduled for next week) \par +Tb mohini- pending CXR. \par Suspecting seronegative RA vs psoriatic arthritis. \par Vit D deficiency \par Reports severe joint pain and swelling. \par \par Meds:\par Ergocalciferol 50.000IU weekly \par \par \par \par Referred by [facility] member  for Rheumatology consultation \par PCP: Eliezer Haddad \par \par 55 y/o F DM on Metformin 1000mg BID, HTN, HLD,  obesity ( BMI 32) \par menopause for past 2 years with recurrent hot flashes. \par He immigrated from [place] to [place] 8 years ago and soon after noticed sensitivity to both hands after cold exposure, with pain and numbness of all fingers.   2 years ago developed  R knee pain and swelling, initially it was on and off and for past one year feels persistent daily pain, has trouble bare the weight on right side, ambulation is difficult,  for past 4 months she has been experiencing polyarthralgia, hours of AM stiffness. \par Neck pain radiating down to entire spine. \par can not get up from the bed or sitting position without assistance,  the pain. Has trouble making the fist, unable to open jars or bottles. \par Has tried NSAID( brought from [place]) Nimesil which helps with the pain. \par She has hx of psoriasis ( affecting scalp, posterior neck and extensor elbows), using topical steroid cream ( Betamethasone ( brand name  Diprosalic) \par Hx of plantar  fasciitis \par Has fatigue,dry mouth, does not drink enough water \par muscle ache \par \par Denies hx of dactylitis, tendinitis or inflammatory eye disease. \par \par No h/o , memory loss, patchy hair loss, sicca symptoms, photosensitivity, HA,  Raynaud's, oral ulcers, nasal ulcers. \par No history of pleuropericarditis \par No history of protein/hematuria \par No history of cytopenias. \par No Hx of VTE/DVT/PE\par  [Anorexia] : no anorexia [Weight Loss] : no weight loss [Malaise] : no malaise [Fever] : no fever [Chills] : no chills [Malar Facial Rash] : no malar facial rash [Skin Lesions] : no lesions [Skin Nodules] : no skin nodules [Oral Ulcers] : no oral ulcers [Cough] : no cough [Dysphonia] : no dysphonia [Dysphagia] : no dysphagia [Shortness of Breath] : no shortness of breath [Chest Pain] : no chest pain [Joint Warmth] : no joint warmth [Falls] : no falls [Dyspnea] : no dyspnea [Muscle Weakness] : no muscle weakness [Muscle Cramping] : no muscle cramping [Visual Changes] : no visual changes [Eye Pain] : no eye pain [Eye Redness] : no eye redness [Dry Eyes] : no dry eyes

## 2022-03-29 NOTE — ASSESSMENT
[FreeTextEntry1] : 55 y/o F DM on Metformin 1000mg BID, HTN, HLD,  obesity ( BMI 32), psoriasis  was referred for chronic joint pain and swelling that has been ongoing for past few years. Original exam noted with R wrist and R knee active synovitis and psoriatic plaques \par Suspecting inflammatory arthritis, RA vs psoriatic arthritis\par Has elevated inflammatory markers, RF, CCP negative \par Low titer + GEMMA but negative confirmatory lupus labs- xrays no radiographic inflammatory changes reported. \par Found uncontrolled DM with high HgA1C( >9) saw shana, recommended insulin but she declined. \par Latent TB: on Rifampin and plan is to treat for 4 months. \par \par \par 1. Inflammatory arthritis: seronegative RA vs psoriatic arthritis , joint symptoms improving since MTX increased \par we talked about titrating MTX up to 20mg q weekly if   if monitoring serologies permissive\par will check labs today ( CBC, Chem, ESR, CRP- done during the visit today) \par c/w Folic acid\par May consider adding biologic depends on response on max MTX dose. \par Can apply topical  vitamin D analogs twice daily, given uncontrolled DM will minimize topical steroid use, as absorption is still possible and her psoriasis does not involve more then 2% os skin area. \par \par 2. latent TB:  advised to increase Rifampin as prescribed ( 2tab a day)  \par \par 3. Vit D deficiency: c/w replacement once a week.  \par \par f/u in 2 month \par \par \par

## 2022-03-29 NOTE — PHYSICAL EXAM
[General Appearance - Alert] : alert [General Appearance - In No Acute Distress] : in no acute distress [Neck Appearance] : the appearance of the neck was normal [] : no respiratory distress [Respiration, Rhythm And Depth] : normal respiratory rhythm and effort [Auscultation Breath Sounds / Voice Sounds] : lungs were clear to auscultation bilaterally [Heart Rate And Rhythm] : heart rate was normal and rhythm regular [Heart Sounds] : normal S1 and S2 [Nail Clubbing] : no clubbing  or cyanosis of the fingernails [Motor Tone] : muscle strength and tone were normal [Oriented To Time, Place, And Person] : oriented to person, place, and time [Impaired Insight] : insight and judgment were intact [FreeTextEntry1] : psoriatic plaques in scalp and ext surfaces of b/l elbow

## 2022-03-30 LAB
ALBUMIN SERPL ELPH-MCNC: 5 G/DL
ALP BLD-CCNC: 67 U/L
ALT SERPL-CCNC: 18 U/L
ANION GAP SERPL CALC-SCNC: 14 MMOL/L
AST SERPL-CCNC: 16 U/L
BASOPHILS # BLD AUTO: 0.02 K/UL
BASOPHILS NFR BLD AUTO: 0.3 %
BILIRUB SERPL-MCNC: 0.3 MG/DL
BUN SERPL-MCNC: 10 MG/DL
CALCIUM SERPL-MCNC: 10.4 MG/DL
CHLORIDE SERPL-SCNC: 101 MMOL/L
CO2 SERPL-SCNC: 25 MMOL/L
CREAT SERPL-MCNC: 0.53 MG/DL
CRP SERPL-MCNC: 8 MG/L
EGFR: 109 ML/MIN/1.73M2
EOSINOPHIL # BLD AUTO: 0.09 K/UL
EOSINOPHIL NFR BLD AUTO: 1.3 %
ERYTHROCYTE [SEDIMENTATION RATE] IN BLOOD BY WESTERGREN METHOD: 23 MM/HR
GLUCOSE SERPL-MCNC: 86 MG/DL
HCT VFR BLD CALC: 42.1 %
HGB BLD-MCNC: 13.5 G/DL
IMM GRANULOCYTES NFR BLD AUTO: 0.3 %
LYMPHOCYTES # BLD AUTO: 1.65 K/UL
LYMPHOCYTES NFR BLD AUTO: 24.7 %
MAN DIFF?: NORMAL
MCHC RBC-ENTMCNC: 28.4 PG
MCHC RBC-ENTMCNC: 32.1 GM/DL
MCV RBC AUTO: 88.6 FL
MONOCYTES # BLD AUTO: 0.43 K/UL
MONOCYTES NFR BLD AUTO: 6.4 %
NEUTROPHILS # BLD AUTO: 4.47 K/UL
NEUTROPHILS NFR BLD AUTO: 67 %
PLATELET # BLD AUTO: 260 K/UL
POTASSIUM SERPL-SCNC: 4.3 MMOL/L
PROT SERPL-MCNC: 7.5 G/DL
RBC # BLD: 4.75 M/UL
RBC # FLD: 13.8 %
SODIUM SERPL-SCNC: 140 MMOL/L
WBC # FLD AUTO: 6.68 K/UL

## 2022-03-30 RX ORDER — METHOTREXATE 2.5 MG/1
2.5 TABLET ORAL
Qty: 32 | Refills: 3 | Status: ACTIVE | COMMUNITY
Start: 2021-12-17 | End: 1900-01-01

## 2022-05-09 RX ORDER — ERGOCALCIFEROL 1.25 MG/1
1.25 MG CAPSULE ORAL
Qty: 4 | Refills: 5 | Status: ACTIVE | COMMUNITY
Start: 2021-11-19 | End: 1900-01-01

## 2022-06-09 RX ORDER — FOLIC ACID 1 MG/1
1 TABLET ORAL DAILY
Qty: 30 | Refills: 5 | Status: ACTIVE | COMMUNITY
Start: 2021-12-17 | End: 1900-01-01

## 2022-06-24 ENCOUNTER — APPOINTMENT (OUTPATIENT)
Dept: RHEUMATOLOGY | Facility: CLINIC | Age: 57
End: 2022-06-24
Payer: MEDICAID

## 2022-06-24 VITALS
OXYGEN SATURATION: 96 % | DIASTOLIC BLOOD PRESSURE: 78 MMHG | HEIGHT: 61 IN | WEIGHT: 170 LBS | BODY MASS INDEX: 32.1 KG/M2 | HEART RATE: 104 BPM | TEMPERATURE: 98 F | SYSTOLIC BLOOD PRESSURE: 113 MMHG

## 2022-06-24 DIAGNOSIS — Z22.7 LATENT TUBERCULOSIS: ICD-10-CM

## 2022-06-24 PROCEDURE — 99214 OFFICE O/P EST MOD 30 MIN: CPT | Mod: 25

## 2022-06-24 NOTE — REVIEW OF SYSTEMS
[Chills] : chills [Feeling Tired] : feeling tired [Recent Weight Gain (___ Lbs)] : recent [unfilled] ~Ulb weight gain [Arthralgias] : arthralgias [Joint Pain] : joint pain [Joint Swelling] : joint swelling [Joint Stiffness] : joint stiffness [Limb Pain] : limb pain [Negative] : Gastrointestinal [Fever] : no fever [Feeling Poorly] : not feeling poorly [Recent Weight Loss (___ Lbs)] : no recent weight loss [Dizziness] : no dizziness [Fainting] : no fainting [Anxiety] : no anxiety [Depression] : no depression [Muscle Weakness] : no muscle weakness [Easy Bleeding] : no tendency for easy bleeding [Easy Bruising] : no tendency for easy bruising [Swollen Glands] : no swollen glands [Swollen Glands In The Neck] : no swollen glands in the neck [de-identified] : psoriasis plaques

## 2022-06-24 NOTE — ASSESSMENT
[FreeTextEntry1] : 57 y/o F DM on Metformin 1000mg BID, HTN, HLD,  obesity ( BMI 32), psoriasis  was referred for chronic joint pain and swelling that has been ongoing for past few years. Original exam noted with R wrist and R knee active synovitis and psoriatic plaques \par Suspecting inflammatory arthritis, RA vs psoriatic arthritis\par Has elevated inflammatory markers, RF, CCP negative \par Low titer + GEMMA but negative confirmatory lupus labs- xrays no radiographic inflammatory changes reported. \par Found uncontrolled DM with high HgA1C( >9) saw shana, recommended insulin but she declined. Now on trulicity \par Latent TB: on Rifampin and plan is to treat for 4 months( she will complete treatment in few weeks)\par \par \par 1. Inflammatory arthritis: seronegative RA vs psoriatic arthritis , joint symptoms improving since MTX increased  to 20mg q weekly but now has more GI symptoms, nausea and intermittent abdominal pain. \par We talked about switching po formulation to injectable Rasuvo to bypass Gi effect from oral preparation and monitor symptoms, if no improvement will switch to biologic DMARD such as  Humira. \par check labs: CBC, Chem, ESR, CRP  \par c/w Folic acid. \par Blood drawn during the visit today.\par \par \par 2. latent TB:  c/w Rifampin as prescribed ( 2tab a day)  \par \par 3. Vit D deficiency: c/w replacement once a week.  \par \par \par f/u in 6 weeks \par \par \par

## 2022-06-24 NOTE — HISTORY OF PRESENT ILLNESS
[Fatigue] : fatigue [Dry Mouth] : dry mouth [Arthralgias] : arthralgias [Joint Swelling] : joint swelling [Decreased ROM] : decreased range of motion [Morning Stiffness] : morning stiffness [Difficulty Walking] : difficulty walking [Myalgias] : myalgias [Muscle Spasms] : muscle spasms [FreeTextEntry1] : Follow up: 6/24/22 \par \par 55 y/o F with Inflammatory arthritis: seronegative RA vs psoriatic arthritis , joint symptoms improving since MTX increased to 20mg but she has been experiencing nausea and abdominal pain more. Pain in both thumbs, but otherwise no other joint pain. \par Cervical neck pain. \par Endocrinologist added Trulicity once a week \par \par Meds: \par MTX 20mg \par Folic acid 1mg daily \par \par \par \par Follow up: 3/2922 \par PCP: Dr. Jeronimo Mera \par \par 55 y/o F DM on Metformin 1000mg BID, HTN, HLD,  obesity, inflammatory arthritis likely seronegative RA or psoriatic arthritis \par Has elevated inflammatory markers, RF, CCP negative \par Found uncontrolled DM with high HgA1C- saw endo, recommended insulin but she declined. She takes Glimepiride \par latent Tb- had Xrays done outside of the [facility], saw ID and started on Rifampin but instead of taking 2 tablets she takes one a day. \par MSK symptoms joint pain, swelling and stiffness significantly improved about  since MTX increased, overall feels 30-40% improvement. \par She established care with primary care doc and states BP is well controlled. \par lost 7Ibs since last visit, trying to watch diet. \par \par Meds:\par Ergocalciferol 50.000IU weekly \par MTX 15mg q weekly and folic acid ( started 1 month ago)\par \par \par \par \par \par Follow up: 1/18/22 \par \par 55 y/o F DM on Metformin 1000mg BID, HTN, HLD,  obesity, inflammatory arthritis likely seronegative RA or psoriatic arthritis \par Has elevated inflammatory markers, RF, CCP negative \par Low titer + GEMMA but negative confirmatory lupus labs- xrays no radiographic inflammatory changes reported. \par Found uncontrolled DM with high HgA1C- saw endo, recommended insulin but she declined. \par Likely latent Tb- had Xrays done outside of the [facility], have not received copy, she has not made appt with ID \par UTI- treated with abx  \par \par Suspecting seronegative RA vs psoriatic arthritis. \par Vit D deficiency \par Reports severe joint pain and intermittent swelling. She does not feel any difference since MTX yet. \par \par Meds:\par Ergocalciferol 50.000IU weekly \par MTX 7.5mg q weekly since last visit along with folic acid ( started 1 month ago)\par \par \par \par \par \par \par \par Follow up: 12/17/21 \par Telephonic visit \par 55 y/o F DM on Metformin 1000mg BID, HTN, HLD,  obesity, inflammatory arthritis likely seronegative RA or psoriatic arthritis \par Has elevated inflammatory markers, RF, CCP negative \par Low titer + ANBA but negative confirmatory lupus labs- xrays no radiographic inflammatory changes reported. \par Found uncontrolled DM with high HgA1C- pending endo appt ( scheduled for next week) \par +Tb mohini- pending CXR. \par Suspecting seronegative RA vs psoriatic arthritis. \par Vit D deficiency \par Reports severe joint pain and swelling. \par \par Meds:\par Ergocalciferol 50.000IU weekly \par \par \par \par Referred by [facility] community member  for Rheumatology consultation \par PCP: Eliezer Haddad \par \par 55 y/o F DM on Metformin 1000mg BID, HTN, HLD,  obesity ( BMI 32) \par menopause for past 2 years with recurrent hot flashes. \par He immigrated from [place] to [place] 8 years ago and soon after noticed sensitivity to both hands after cold exposure, with pain and numbness of all fingers.   2 years ago developed  R knee pain and swelling, initially it was on and off and for past one year feels persistent daily pain, has trouble bare the weight on right side, ambulation is difficult,  for past 4 months she has been experiencing polyarthralgia, hours of AM stiffness. \par Neck pain radiating down to entire spine. \par can not get up from the bed or sitting position without assistance,  the pain. Has trouble making the fist, unable to open jars or bottles. \par Has tried NSAID( brought from [place]) Nimesil which helps with the pain. \par She has hx of psoriasis ( affecting scalp, posterior neck and extensor elbows), using topical steroid cream ( Betamethasone ( brand name  Diprosalic) \par Hx of plantar  fasciitis \par Has fatigue,dry mouth, does not drink enough water \par muscle ache \par \par Denies hx of dactylitis, tendinitis or inflammatory eye disease. \par \par No h/o , memory loss, patchy hair loss, sicca symptoms, photosensitivity, HA,  Raynaud's, oral ulcers, nasal ulcers. \par No history of pleuropericarditis \par No history of protein/hematuria \par No history of cytopenias. \par No Hx of VTE/DVT/PE\par  [___ Month(s) Ago] : [unfilled] month(s) ago [Anorexia] : no anorexia [Weight Loss] : no weight loss [Malaise] : no malaise [Fever] : no fever [Chills] : no chills [Malar Facial Rash] : no malar facial rash [Skin Lesions] : no lesions [Skin Nodules] : no skin nodules [Oral Ulcers] : no oral ulcers [Cough] : no cough [Dysphonia] : no dysphonia [Dysphagia] : no dysphagia [Shortness of Breath] : no shortness of breath [Chest Pain] : no chest pain [Joint Warmth] : no joint warmth [Falls] : no falls [Dyspnea] : no dyspnea [Muscle Weakness] : no muscle weakness [Muscle Cramping] : no muscle cramping [Visual Changes] : no visual changes [Eye Pain] : no eye pain [Eye Redness] : no eye redness [Dry Eyes] : no dry eyes

## 2022-06-27 LAB
ALBUMIN SERPL ELPH-MCNC: 5.1 G/DL
ALP BLD-CCNC: 72 U/L
ALT SERPL-CCNC: 21 U/L
ANION GAP SERPL CALC-SCNC: 18 MMOL/L
AST SERPL-CCNC: 17 U/L
BASOPHILS # BLD AUTO: 0.02 K/UL
BASOPHILS NFR BLD AUTO: 0.3 %
BILIRUB SERPL-MCNC: 0.4 MG/DL
BUN SERPL-MCNC: 8 MG/DL
CALCIUM SERPL-MCNC: 10.5 MG/DL
CHLORIDE SERPL-SCNC: 97 MMOL/L
CO2 SERPL-SCNC: 25 MMOL/L
CREAT SERPL-MCNC: 0.56 MG/DL
CRP SERPL-MCNC: 9 MG/L
EGFR: 108 ML/MIN/1.73M2
EOSINOPHIL # BLD AUTO: 0.06 K/UL
EOSINOPHIL NFR BLD AUTO: 0.8 %
ERYTHROCYTE [SEDIMENTATION RATE] IN BLOOD BY WESTERGREN METHOD: 31 MM/HR
GLUCOSE SERPL-MCNC: 96 MG/DL
HCT VFR BLD CALC: 44.8 %
HGB BLD-MCNC: 14.9 G/DL
IMM GRANULOCYTES NFR BLD AUTO: 0.3 %
LYMPHOCYTES # BLD AUTO: 1.95 K/UL
LYMPHOCYTES NFR BLD AUTO: 26.6 %
MAN DIFF?: NORMAL
MCHC RBC-ENTMCNC: 29.8 PG
MCHC RBC-ENTMCNC: 33.3 GM/DL
MCV RBC AUTO: 89.6 FL
MONOCYTES # BLD AUTO: 0.57 K/UL
MONOCYTES NFR BLD AUTO: 7.8 %
NEUTROPHILS # BLD AUTO: 4.72 K/UL
NEUTROPHILS NFR BLD AUTO: 64.2 %
PLATELET # BLD AUTO: 290 K/UL
POTASSIUM SERPL-SCNC: 4.5 MMOL/L
PROT SERPL-MCNC: 7.6 G/DL
RBC # BLD: 5 M/UL
RBC # FLD: 13 %
SODIUM SERPL-SCNC: 141 MMOL/L
WBC # FLD AUTO: 7.34 K/UL

## 2022-07-07 ENCOUNTER — NON-APPOINTMENT (OUTPATIENT)
Age: 57
End: 2022-07-07

## 2022-08-16 ENCOUNTER — APPOINTMENT (OUTPATIENT)
Dept: RHEUMATOLOGY | Facility: CLINIC | Age: 57
End: 2022-08-16

## 2022-08-16 VITALS
BODY MASS INDEX: 30.02 KG/M2 | DIASTOLIC BLOOD PRESSURE: 35 MMHG | TEMPERATURE: 97.6 F | WEIGHT: 159 LBS | HEIGHT: 61 IN | OXYGEN SATURATION: 96 % | SYSTOLIC BLOOD PRESSURE: 69 MMHG | HEART RATE: 78 BPM

## 2022-08-16 VITALS — SYSTOLIC BLOOD PRESSURE: 82 MMHG | DIASTOLIC BLOOD PRESSURE: 48 MMHG

## 2022-08-16 DIAGNOSIS — I95.9 HYPOTENSION, UNSPECIFIED: ICD-10-CM

## 2022-08-16 DIAGNOSIS — Z86.39 PERSONAL HISTORY OF OTHER ENDOCRINE, NUTRITIONAL AND METABOLIC DISEASE: ICD-10-CM

## 2022-08-16 DIAGNOSIS — L40.9 PSORIASIS, UNSPECIFIED: ICD-10-CM

## 2022-08-16 DIAGNOSIS — R76.12 NONSPECIFIC REACTION TO CELL MEDIATED IMMUNITY MEASUREMENT OF GAMMA INTERFERON ANTIGEN RESPONSE W/OUT ACTIVE TUBERCULOSIS: ICD-10-CM

## 2022-08-16 PROCEDURE — 99214 OFFICE O/P EST MOD 30 MIN: CPT | Mod: 25

## 2022-08-16 PROCEDURE — 36415 COLL VENOUS BLD VENIPUNCTURE: CPT

## 2022-08-16 RX ORDER — RIFAMPIN 300 MG/1
300 CAPSULE ORAL
Qty: 60 | Refills: 0 | Status: ACTIVE | COMMUNITY
Start: 2022-03-09

## 2022-08-16 RX ORDER — ASPIRIN 81 MG/1
81 TABLET, COATED ORAL
Qty: 30 | Refills: 0 | Status: ACTIVE | COMMUNITY
Start: 2022-02-24

## 2022-08-16 NOTE — REVIEW OF SYSTEMS
[Chills] : chills [Feeling Tired] : feeling tired [Recent Weight Gain (___ Lbs)] : recent [unfilled] ~Ulb weight gain [Arthralgias] : arthralgias [Joint Pain] : joint pain [Joint Swelling] : joint swelling [Joint Stiffness] : joint stiffness [Limb Pain] : limb pain [Negative] : Gastrointestinal [Fever] : no fever [Feeling Poorly] : not feeling poorly [Recent Weight Loss (___ Lbs)] : no recent weight loss [Dizziness] : no dizziness [Fainting] : no fainting [Anxiety] : no anxiety [Depression] : no depression [Muscle Weakness] : no muscle weakness [Easy Bleeding] : no tendency for easy bleeding [Easy Bruising] : no tendency for easy bruising [Swollen Glands] : no swollen glands [Swollen Glands In The Neck] : no swollen glands in the neck [de-identified] : psoriasis plaques

## 2022-08-16 NOTE — HISTORY OF PRESENT ILLNESS
[___ Month(s) Ago] : [unfilled] month(s) ago [Fatigue] : fatigue [Dry Mouth] : dry mouth [Arthralgias] : arthralgias [Joint Swelling] : joint swelling [Decreased ROM] : decreased range of motion [Morning Stiffness] : morning stiffness [Difficulty Walking] : difficulty walking [Myalgias] : myalgias [Muscle Spasms] : muscle spasms [FreeTextEntry1] : Follow up: 8/16/22 \par \par 55 y/o F with Inflammatory arthritis: seronegative RA vs psoriatic arthritis. \par No GI symptoms since MTX po changed to s/c. \par Endocrinologist added Trulicity once a week. \par She lost about 11lbs since last visit, states for past 2 weeks her BP is running low, has not seen PCP, continued taking BP meds takes Lisinopril 20/HCTZ12.5 \par Denies CP, palpitation or SOB. \par \par Meds: \par MTX 20mg \par Folic acid 1mg daily \par \par \par Follow up: 6/24/22 \par \par 55 y/o F with Inflammatory arthritis: seronegative RA vs psoriatic arthritis , joint symptoms improving since MTX increased to 20mg but she has been experiencing nausea and abdominal pain more. Pain in both thumbs, but otherwise no other joint pain. \par Cervical neck pain. \par Endocrinologist added Trulicity once a week \par \par Meds: \par MTX 20mg \par Folic acid 1mg daily \par \par \par \par Follow up: 3/2922 \par PCP: Dr. Jeronimo Mera \par \par 55 y/o F DM on Metformin 1000mg BID, HTN, HLD,  obesity, inflammatory arthritis likely seronegative RA or psoriatic arthritis \par Has elevated inflammatory markers, RF, CCP negative \par Found uncontrolled DM with high HgA1C- saw endo, recommended insulin but she declined. She takes Glimepiride \par latent Tb- had Xrays done outside of the [facility], saw ID and started on Rifampin but instead of taking 2 tablets she takes one a day. \par MSK symptoms joint pain, swelling and stiffness significantly improved about  since MTX increased, overall feels 30-40% improvement. \par She established care with primary care doc and states BP is well controlled. \par lost 7Ibs since last visit, trying to watch diet. \par \par Meds:\par Ergocalciferol 50.000IU weekly \par MTX 15mg q weekly and folic acid ( started 1 month ago)\par \par \par \par \par \par Follow up: 1/18/22 \par \par 55 y/o F DM on Metformin 1000mg BID, HTN, HLD,  obesity, inflammatory arthritis likely seronegative RA or psoriatic arthritis \par Has elevated inflammatory markers, RF, CCP negative \par Low titer + GEMMA but negative confirmatory lupus labs- xrays no radiographic inflammatory changes reported. \par Found uncontrolled DM with high HgA1C- saw endo, recommended insulin but she declined. \par Likely latent Tb- had Xrays done outside of the [facility], have not received copy, she has not made appt with ID \par UTI- treated with abx  \par \par Suspecting seronegative RA vs psoriatic arthritis. \par Vit D deficiency \par Reports severe joint pain and intermittent swelling. She does not feel any difference since MTX yet. \par \par Meds:\par Ergocalciferol 50.000IU weekly \par MTX 7.5mg q weekly since last visit along with folic acid ( started 1 month ago)\par \par \par \par \par \par \par \par Follow up: 12/17/21 \par Telephonic visit \par 55 y/o F DM on Metformin 1000mg BID, HTN, HLD,  obesity, inflammatory arthritis likely seronegative RA or psoriatic arthritis \par Has elevated inflammatory markers, RF, CCP negative \par Low titer + ANBA but negative confirmatory lupus labs- xrays no radiographic inflammatory changes reported. \par Found uncontrolled DM with high HgA1C- pending endo appt ( scheduled for next week) \par +Tb mohini- pending CXR. \par Suspecting seronegative RA vs psoriatic arthritis. \par Vit D deficiency \par Reports severe joint pain and swelling. \par \par Meds:\par Ergocalciferol 50.000IU weekly \par \par \par \par Referred by [facility] community member  for Rheumatology consultation \par PCP: Eliezer Haddad \par \par 55 y/o F DM on Metformin 1000mg BID, HTN, HLD,  obesity ( BMI 32) \par menopause for past 2 years with recurrent hot flashes. \par He immigrated from [place] to [place] 8 years ago and soon after noticed sensitivity to both hands after cold exposure, with pain and numbness of all fingers.   2 years ago developed  R knee pain and swelling, initially it was on and off and for past one year feels persistent daily pain, has trouble bare the weight on right side, ambulation is difficult,  for past 4 months she has been experiencing polyarthralgia, hours of AM stiffness. \par Neck pain radiating down to entire spine. \par can not get up from the bed or sitting position without assistance,  the pain. Has trouble making the fist, unable to open jars or bottles. \par Has tried NSAID( brought from [place]) Nimesil which helps with the pain. \par She has hx of psoriasis ( affecting scalp, posterior neck and extensor elbows), using topical steroid cream ( Betamethasone ( brand name  Diprosalic) \par Hx of plantar  fasciitis \par Has fatigue,dry mouth, does not drink enough water \par muscle ache \par \par Denies hx of dactylitis, tendinitis or inflammatory eye disease. \par \par No h/o , memory loss, patchy hair loss, sicca symptoms, photosensitivity, HA,  Raynaud's, oral ulcers, nasal ulcers. \par No history of pleuropericarditis \par No history of protein/hematuria \par No history of cytopenias. \par No Hx of VTE/DVT/PE\par  [Anorexia] : no anorexia [Weight Loss] : no weight loss [Malaise] : no malaise [Fever] : no fever [Chills] : no chills [Malar Facial Rash] : no malar facial rash [Skin Lesions] : no lesions [Skin Nodules] : no skin nodules [Oral Ulcers] : no oral ulcers [Cough] : no cough [Dysphonia] : no dysphonia [Dysphagia] : no dysphagia [Shortness of Breath] : no shortness of breath [Chest Pain] : no chest pain [Joint Warmth] : no joint warmth [Falls] : no falls [Dyspnea] : no dyspnea [Muscle Weakness] : no muscle weakness [Muscle Cramping] : no muscle cramping [Visual Changes] : no visual changes [Eye Pain] : no eye pain [Eye Redness] : no eye redness [Dry Eyes] : no dry eyes

## 2022-08-16 NOTE — PHYSICAL EXAM
[General Appearance - Alert] : alert [General Appearance - In No Acute Distress] : in no acute distress [Neck Appearance] : the appearance of the neck was normal [] : no respiratory distress [Respiration, Rhythm And Depth] : normal respiratory rhythm and effort [Auscultation Breath Sounds / Voice Sounds] : lungs were clear to auscultation bilaterally [Heart Rate And Rhythm] : heart rate was normal and rhythm regular [Heart Sounds] : normal S1 and S2 [Nail Clubbing] : no clubbing  or cyanosis of the fingernails [Motor Tone] : muscle strength and tone were normal [Oriented To Time, Place, And Person] : oriented to person, place, and time [Impaired Insight] : insight and judgment were intact [Exaggerated Use Of Accessory Muscles For Inspiration] : no accessory muscle use [Musculoskeletal - Swelling] : no joint swelling seen [FreeTextEntry1] : psoriatic plaques in scalp and ext surfaces of b/l elbow

## 2022-08-16 NOTE — ASSESSMENT
[FreeTextEntry1] : 55 y/o F DM on Metformin 1000mg BID, HTN, HLD,  obesity ( BMI 32), psoriasis  was referred for chronic joint pain and swelling that has been ongoing for past few years. Original exam noted with R wrist and R knee active synovitis and psoriatic plaques \par Suspecting inflammatory arthritis, RA vs psoriatic arthritis\par Has elevated inflammatory markers, RF, CCP negative \par Low titer + GEMMA but negative confirmatory lupus labs- xrays no radiographic inflammatory changes reported. \par Found uncontrolled DM with high HgA1C( >9) saw shana, recommended insulin but she declined. Now on trulicity doing well, lost weight. \par Latent TB: s/p Rifampin 4 months treatment. \par \par 1. Inflammatory arthritis: likely psoriatic arthritis , joint symptoms significantly improved after MTX,  GI symptoms such as nausea and intermittent abdominal pain all resolved after switching s/c. \par c/w Rasuvo 20mg s/c q weekly and Folic acid daily \par check labs: CBC, Chem, ESR, CRP  \par Blood drawn during the visit today. \par \par 2. Vit D deficiency: completed  replacement once a week and now takes  Vit D 1000IU daily. \par \par 3. Hypotension: Pt refused to go to ER, advised to hold off BP meds for next few days and monitor BP at home, has home BP kit and RN by training so she is aware of BP monitoring. \par asked to drink 500ml water during the visit and BP rechecked 95/55 \par She may need to restart 1/2 tab of Lisinopril/HCTZ and follow up with PCP. \par \par \par f/u in 2 months with Dr. Reid. \par \par

## 2022-08-17 LAB
ALBUMIN SERPL ELPH-MCNC: 4.8 G/DL
ALP BLD-CCNC: 64 U/L
ALT SERPL-CCNC: 17 U/L
ANION GAP SERPL CALC-SCNC: 14 MMOL/L
AST SERPL-CCNC: 17 U/L
BASOPHILS # BLD AUTO: 0.02 K/UL
BASOPHILS NFR BLD AUTO: 0.3 %
BILIRUB SERPL-MCNC: 0.9 MG/DL
BUN SERPL-MCNC: 21 MG/DL
CALCIUM SERPL-MCNC: 10.7 MG/DL
CHLORIDE SERPL-SCNC: 101 MMOL/L
CO2 SERPL-SCNC: 28 MMOL/L
CREAT SERPL-MCNC: 0.72 MG/DL
CRP SERPL-MCNC: <3 MG/L
EGFR: 99 ML/MIN/1.73M2
EOSINOPHIL # BLD AUTO: 0.07 K/UL
EOSINOPHIL NFR BLD AUTO: 1.2 %
ERYTHROCYTE [SEDIMENTATION RATE] IN BLOOD BY WESTERGREN METHOD: 3 MM/HR
GLUCOSE SERPL-MCNC: 175 MG/DL
HCT VFR BLD CALC: 39.9 %
HGB BLD-MCNC: 13 G/DL
IMM GRANULOCYTES NFR BLD AUTO: 0.2 %
LYMPHOCYTES # BLD AUTO: 1.84 K/UL
LYMPHOCYTES NFR BLD AUTO: 30.3 %
MAN DIFF?: NORMAL
MCHC RBC-ENTMCNC: 29.5 PG
MCHC RBC-ENTMCNC: 32.6 GM/DL
MCV RBC AUTO: 90.5 FL
MONOCYTES # BLD AUTO: 0.38 K/UL
MONOCYTES NFR BLD AUTO: 6.3 %
NEUTROPHILS # BLD AUTO: 3.76 K/UL
NEUTROPHILS NFR BLD AUTO: 61.7 %
PLATELET # BLD AUTO: 226 K/UL
POTASSIUM SERPL-SCNC: 4.7 MMOL/L
PROT SERPL-MCNC: 6.9 G/DL
RBC # BLD: 4.41 M/UL
RBC # FLD: 12.7 %
SODIUM SERPL-SCNC: 142 MMOL/L
WBC # FLD AUTO: 6.08 K/UL

## 2022-10-21 ENCOUNTER — APPOINTMENT (OUTPATIENT)
Dept: RHEUMATOLOGY | Facility: CLINIC | Age: 57
End: 2022-10-21

## 2022-10-21 VITALS
OXYGEN SATURATION: 97 % | SYSTOLIC BLOOD PRESSURE: 111 MMHG | TEMPERATURE: 97 F | WEIGHT: 163 LBS | BODY MASS INDEX: 30.78 KG/M2 | DIASTOLIC BLOOD PRESSURE: 70 MMHG | HEART RATE: 76 BPM | HEIGHT: 61 IN

## 2022-10-21 DIAGNOSIS — M19.90 UNSPECIFIED OSTEOARTHRITIS, UNSPECIFIED SITE: ICD-10-CM

## 2022-10-21 DIAGNOSIS — L40.50 ARTHROPATHIC PSORIASIS, UNSPECIFIED: ICD-10-CM

## 2022-10-21 PROCEDURE — 99214 OFFICE O/P EST MOD 30 MIN: CPT | Mod: 25

## 2022-10-21 PROCEDURE — 36415 COLL VENOUS BLD VENIPUNCTURE: CPT

## 2022-10-25 PROBLEM — L40.50 PSORIATIC ARTHRITIS: Status: ACTIVE | Noted: 2022-08-16

## 2022-10-25 LAB
ALBUMIN SERPL ELPH-MCNC: 5 G/DL
ALP BLD-CCNC: 78 U/L
ALT SERPL-CCNC: 27 U/L
ANION GAP SERPL CALC-SCNC: 15 MMOL/L
AST SERPL-CCNC: 22 U/L
BASOPHILS # BLD AUTO: 0.02 K/UL
BASOPHILS NFR BLD AUTO: 0.3 %
BILIRUB SERPL-MCNC: 1.2 MG/DL
BUN SERPL-MCNC: 15 MG/DL
CALCIUM SERPL-MCNC: 10.7 MG/DL
CHLORIDE SERPL-SCNC: 98 MMOL/L
CO2 SERPL-SCNC: 28 MMOL/L
CREAT SERPL-MCNC: 0.67 MG/DL
CRP SERPL-MCNC: <3 MG/L
EGFR: 103 ML/MIN/1.73M2
EOSINOPHIL # BLD AUTO: 0.11 K/UL
EOSINOPHIL NFR BLD AUTO: 1.6 %
ERYTHROCYTE [SEDIMENTATION RATE] IN BLOOD BY WESTERGREN METHOD: 6 MM/HR
GLUCOSE SERPL-MCNC: 164 MG/DL
HCT VFR BLD CALC: 43.7 %
HGB BLD-MCNC: 14.2 G/DL
IMM GRANULOCYTES NFR BLD AUTO: 0.3 %
LYMPHOCYTES # BLD AUTO: 2.07 K/UL
LYMPHOCYTES NFR BLD AUTO: 30.8 %
MAN DIFF?: NORMAL
MCHC RBC-ENTMCNC: 30.1 PG
MCHC RBC-ENTMCNC: 32.5 GM/DL
MCV RBC AUTO: 92.6 FL
MONOCYTES # BLD AUTO: 0.45 K/UL
MONOCYTES NFR BLD AUTO: 6.7 %
NEUTROPHILS # BLD AUTO: 4.06 K/UL
NEUTROPHILS NFR BLD AUTO: 60.3 %
PLATELET # BLD AUTO: 264 K/UL
POTASSIUM SERPL-SCNC: 4.4 MMOL/L
PROT SERPL-MCNC: 7.3 G/DL
RBC # BLD: 4.72 M/UL
RBC # FLD: 12.9 %
SODIUM SERPL-SCNC: 142 MMOL/L
WBC # FLD AUTO: 6.73 K/UL

## 2022-10-25 RX ORDER — METHOTREXATE 20 MG/.4ML
20 INJECTION, SOLUTION SUBCUTANEOUS
Qty: 3 | Refills: 0 | Status: ACTIVE | COMMUNITY
Start: 2022-06-24 | End: 1900-01-01

## 2022-10-25 RX ORDER — AMPICILLIN 500 MG/1
500 CAPSULE ORAL 4 TIMES DAILY
Qty: 20 | Refills: 0 | Status: DISCONTINUED | COMMUNITY
Start: 2021-12-17 | End: 2022-10-25

## 2022-10-25 NOTE — HISTORY OF PRESENT ILLNESS
[FreeTextEntry1] : October 21, 2022\par Patient returns for follow up\par TEA #: [id number] [place] \par Was taking Rasuvo 20 mg qweek but has not been receiving from the insurance company\par Patient was feeling well, asymptomatic when taking Rasuvo\par Has been taking methotrexate orally in place of Rasuvo while waiting for shipment\par Patient recently changed addresses, may be contributing to issues with delivery of Rasuvo.\par No side effects of Rasuvo noted\par No pain at present\par \par Frm previous notes:\par Follow up: 8/16/22 \par \par 57 y/o F with Inflammatory arthritis: seronegative RA vs psoriatic arthritis. \par No GI symptoms since MTX po changed to s/c. \par Endocrinologist added Trulicity once a week. \par She lost about 11lbs since last visit, states for past 2 weeks her BP is running low, has not seen PCP, continued taking BP meds takes Lisinopril 20/HCTZ12.5 \par Denies CP, palpitation or SOB. \par \par Meds: \par MTX 20mg \par Folic acid 1mg da\par \par

## 2022-10-25 NOTE — PHYSICAL EXAM
[General Appearance - Alert] : alert [General Appearance - In No Acute Distress] : in no acute distress [General Appearance - Well-Appearing] : healthy appearing [Sclera] : the sclera and conjunctiva were normal [] : no respiratory distress [Respiration, Rhythm And Depth] : normal respiratory rhythm and effort [Exaggerated Use Of Accessory Muscles For Inspiration] : no accessory muscle use [Abnormal Walk] : normal gait [Nail Clubbing] : no clubbing  or cyanosis of the fingernails [Musculoskeletal - Swelling] : no joint swelling seen [Oriented To Time, Place, And Person] : oriented to person, place, and time [Impaired Insight] : insight and judgment were intact [Affect] : the affect was normal [FreeTextEntry1] : some minimal edema of the right hand

## 2022-10-25 NOTE — ASSESSMENT
[FreeTextEntry1] : 57 year with Inflammatory arthritis, likely psoriatic arthritis, joint symptoms significantly improved after MTX, GI symptoms such as nausea and intermittent abdominal pain all resolved after switching to Rasuvo.  Patient has been having difficulty receiving medication, as recently changed address.  Office spoke with [facility] and arranged for delivery with patient via [place] . Will continue rasuvo 20 mg weekly and folic acid 1 mg qday. Will check labs today in office, follow up in three months. \par \par \par

## 2023-01-26 ENCOUNTER — APPOINTMENT (OUTPATIENT)
Dept: RHEUMATOLOGY | Facility: CLINIC | Age: 58
End: 2023-01-26